# Patient Record
Sex: FEMALE | Race: WHITE | Employment: FULL TIME | URBAN - METROPOLITAN AREA
[De-identification: names, ages, dates, MRNs, and addresses within clinical notes are randomized per-mention and may not be internally consistent; named-entity substitution may affect disease eponyms.]

---

## 2017-04-04 ENCOUNTER — HOSPITAL ENCOUNTER (OUTPATIENT)
Dept: OTHER | Age: 47
Discharge: HOME | End: 2017-04-04
Attending: PHYSICIAN ASSISTANT | Admitting: PHYSICIAN ASSISTANT

## 2017-04-04 ENCOUNTER — OFFICE VISIT (OUTPATIENT)
Dept: FAMILY MEDICINE CLINIC | Age: 47
End: 2017-04-04

## 2017-04-04 ENCOUNTER — HOSPITAL ENCOUNTER (OUTPATIENT)
Dept: GENERAL RADIOLOGY | Age: 47
Discharge: HOME | End: 2017-04-04

## 2017-04-04 VITALS
OXYGEN SATURATION: 99 % | DIASTOLIC BLOOD PRESSURE: 80 MMHG | TEMPERATURE: 98.7 F | WEIGHT: 115.4 LBS | HEART RATE: 112 BPM | HEIGHT: 59 IN | SYSTOLIC BLOOD PRESSURE: 122 MMHG | BODY MASS INDEX: 23.26 KG/M2

## 2017-04-04 DIAGNOSIS — M54.9 BACKACHE, UNSPECIFIED: ICD-10-CM

## 2017-04-04 DIAGNOSIS — M54.9 BACK PAIN, UNSPECIFIED BACK LOCATION, UNSPECIFIED BACK PAIN LATERALITY, UNSPECIFIED CHRONICITY: Primary | ICD-10-CM

## 2017-04-04 PROCEDURE — 99213 OFFICE O/P EST LOW 20 MIN: CPT | Performed by: PHYSICIAN ASSISTANT

## 2017-04-04 RX ORDER — METHOCARBAMOL 500 MG/1
500 TABLET, FILM COATED ORAL 4 TIMES DAILY
Qty: 40 TABLET | Refills: 0 | Status: SHIPPED | OUTPATIENT
Start: 2017-04-04 | End: 2017-04-14

## 2017-04-04 ASSESSMENT — ENCOUNTER SYMPTOMS
RESPIRATORY NEGATIVE: 1
BACK PAIN: 1

## 2018-12-18 ENCOUNTER — OFFICE VISIT (OUTPATIENT)
Dept: FAMILY MEDICINE CLINIC | Age: 48
End: 2018-12-18
Payer: COMMERCIAL

## 2018-12-18 VITALS
BODY MASS INDEX: 22.58 KG/M2 | OXYGEN SATURATION: 98 % | WEIGHT: 115 LBS | HEIGHT: 60 IN | SYSTOLIC BLOOD PRESSURE: 106 MMHG | HEART RATE: 105 BPM | DIASTOLIC BLOOD PRESSURE: 60 MMHG | TEMPERATURE: 98.7 F

## 2018-12-18 DIAGNOSIS — B34.9 VIRAL ILLNESS: Primary | ICD-10-CM

## 2018-12-18 PROCEDURE — 99213 OFFICE O/P EST LOW 20 MIN: CPT | Performed by: FAMILY MEDICINE

## 2018-12-18 RX ORDER — IBUPROFEN 800 MG/1
800 TABLET ORAL
Qty: 30 TABLET | Refills: 2 | Status: SHIPPED | OUTPATIENT
Start: 2018-12-18

## 2018-12-18 ASSESSMENT — ENCOUNTER SYMPTOMS
SINUS PRESSURE: 1
ABDOMINAL PAIN: 0
COUGH: 1
WHEEZING: 0
RHINORRHEA: 1
CHEST TIGHTNESS: 0
STRIDOR: 0

## 2019-03-20 ENCOUNTER — TELEPHONE (OUTPATIENT)
Dept: FAMILY MEDICINE CLINIC | Age: 49
End: 2019-03-20

## 2019-07-22 ENCOUNTER — OFFICE VISIT (OUTPATIENT)
Dept: FAMILY MEDICINE CLINIC | Age: 49
End: 2019-07-22
Payer: COMMERCIAL

## 2019-07-22 VITALS
BODY MASS INDEX: 23.16 KG/M2 | SYSTOLIC BLOOD PRESSURE: 112 MMHG | HEIGHT: 60 IN | HEART RATE: 87 BPM | DIASTOLIC BLOOD PRESSURE: 82 MMHG | WEIGHT: 118 LBS | OXYGEN SATURATION: 99 %

## 2019-07-22 DIAGNOSIS — F41.9 ANXIETY: ICD-10-CM

## 2019-07-22 DIAGNOSIS — Z00.00 ROUTINE GENERAL MEDICAL EXAMINATION AT A HEALTH CARE FACILITY: Primary | ICD-10-CM

## 2019-07-22 DIAGNOSIS — Z12.11 SCREEN FOR COLON CANCER: ICD-10-CM

## 2019-07-22 PROCEDURE — 99396 PREV VISIT EST AGE 40-64: CPT | Performed by: FAMILY MEDICINE

## 2019-07-22 PROCEDURE — 99213 OFFICE O/P EST LOW 20 MIN: CPT | Performed by: FAMILY MEDICINE

## 2019-07-22 NOTE — PROGRESS NOTES
2016    MCV 92.8 2016     2016     Lab Results   Component Value Date    CHOL 189 2016    TRIG 124 2016    HDL 41 2016        Assessment/Plan:    Tomás Heller was seen today for annual exam.    Diagnoses and all orders for this visit:    Routine general medical examination at a health care facility, patient is choosing to go to an outside lab. She is to call the office 48 hours after her blood work to make sure that we get the test results back to her  -     Lipid Panel; Future  -     Comprehensive Metabolic Panel; Future  -     CBC Auto Differential; Future  -     HIV Screen; Future    Screen for colon cancer  -     Melissa Landaverde MD, Gastroenterology, [place]    Anxiety, see below separate visit    Other orders  -     sertraline (ZOLOFT) 50 MG tablet; Take 1 tablet by mouth daily        Subjective:      Ivanna Espana is a 46 y.o. female who presents for evaluation of anxiety. Patient was here for her preventative medicine visit, however she has had significant issues with anxiety. This has stemmed from both of her parents passing away in a very short period of time. Both parents became ill fairly suddenly and  fairly suddenly after their diagnoses. Patient states she really did not have time to grieve from the loss of her mother because immediately she was taking care of her father. However he passed away one year ago. She states prior to this she worried quite a bit. She has 2 adult children that are also a source of anxiety. She has been meeting with a counselor. The counselor suggested that she consider medication for her racing thoughts. Patient has never taken medication previously. She has a cousin that also suffers from anxiety. Patient is unable to exercise due to her urinary incontinence issues.   Patient is also perimenopausal which is contributing to her symptoms    Current Outpatient Medications   Medication Sig Dispense Refill

## 2019-08-16 ENCOUNTER — TELEPHONE (OUTPATIENT)
Dept: FAMILY MEDICINE CLINIC | Age: 49
End: 2019-08-16

## 2019-08-16 ENCOUNTER — NURSE ONLY (OUTPATIENT)
Dept: FAMILY MEDICINE CLINIC | Age: 49
End: 2019-08-16
Payer: COMMERCIAL

## 2019-08-16 DIAGNOSIS — Z00.00 ROUTINE GENERAL MEDICAL EXAMINATION AT A HEALTH CARE FACILITY: ICD-10-CM

## 2019-08-16 LAB
A/G RATIO: 1.9 (ref 1.1–2.2)
ALBUMIN SERPL-MCNC: 4.7 G/DL (ref 3.4–5)
ALP BLD-CCNC: 68 U/L (ref 40–129)
ALT SERPL-CCNC: 21 U/L (ref 10–40)
ANION GAP SERPL CALCULATED.3IONS-SCNC: 15 MMOL/L (ref 3–16)
AST SERPL-CCNC: 16 U/L (ref 15–37)
BASOPHILS ABSOLUTE: 0.1 K/UL (ref 0–0.2)
BASOPHILS RELATIVE PERCENT: 1 %
BILIRUB SERPL-MCNC: 0.4 MG/DL (ref 0–1)
BUN BLDV-MCNC: 20 MG/DL (ref 7–20)
CALCIUM SERPL-MCNC: 9.4 MG/DL (ref 8.3–10.6)
CHLORIDE BLD-SCNC: 105 MMOL/L (ref 99–110)
CHOLESTEROL, TOTAL: 187 MG/DL (ref 0–199)
CO2: 24 MMOL/L (ref 21–32)
CREAT SERPL-MCNC: 0.6 MG/DL (ref 0.6–1.1)
EOSINOPHILS ABSOLUTE: 0.2 K/UL (ref 0–0.6)
EOSINOPHILS RELATIVE PERCENT: 3.4 %
GFR AFRICAN AMERICAN: >60
GFR NON-AFRICAN AMERICAN: >60
GLOBULIN: 2.5 G/DL
GLUCOSE BLD-MCNC: 105 MG/DL (ref 70–99)
HCT VFR BLD CALC: 39.9 % (ref 36–48)
HDLC SERPL-MCNC: 38 MG/DL (ref 40–60)
HEMOGLOBIN: 13.8 G/DL (ref 12–16)
LDL CHOLESTEROL CALCULATED: 113 MG/DL
LYMPHOCYTES ABSOLUTE: 1.5 K/UL (ref 1–5.1)
LYMPHOCYTES RELATIVE PERCENT: 26.5 %
MCH RBC QN AUTO: 32.2 PG (ref 26–34)
MCHC RBC AUTO-ENTMCNC: 34.6 G/DL (ref 31–36)
MCV RBC AUTO: 92.9 FL (ref 80–100)
MONOCYTES ABSOLUTE: 0.4 K/UL (ref 0–1.3)
MONOCYTES RELATIVE PERCENT: 6.7 %
NEUTROPHILS ABSOLUTE: 3.4 K/UL (ref 1.7–7.7)
NEUTROPHILS RELATIVE PERCENT: 62.4 %
PDW BLD-RTO: 12.6 % (ref 12.4–15.4)
PLATELET # BLD: 190 K/UL (ref 135–450)
PMV BLD AUTO: 10.2 FL (ref 5–10.5)
POTASSIUM SERPL-SCNC: 4.1 MMOL/L (ref 3.5–5.1)
RBC # BLD: 4.3 M/UL (ref 4–5.2)
SODIUM BLD-SCNC: 144 MMOL/L (ref 136–145)
TOTAL PROTEIN: 7.2 G/DL (ref 6.4–8.2)
TRIGL SERPL-MCNC: 180 MG/DL (ref 0–150)
VLDLC SERPL CALC-MCNC: 36 MG/DL
WBC # BLD: 5.5 K/UL (ref 4–11)

## 2019-08-16 PROCEDURE — 36415 COLL VENOUS BLD VENIPUNCTURE: CPT | Performed by: FAMILY MEDICINE

## 2019-08-16 NOTE — TELEPHONE ENCOUNTER
Call patient and get more information.   Verify that she was taking a half of her 50 mg tablet for a dose of 25 mg

## 2019-08-17 LAB
HIV AG/AB: NORMAL
HIV ANTIGEN: NORMAL
HIV-1 ANTIBODY: NORMAL
HIV-2 AB: NORMAL

## 2020-09-10 ENCOUNTER — OFFICE VISIT (OUTPATIENT)
Dept: UROGYNECOLOGY | Age: 50
End: 2020-09-10
Payer: COMMERCIAL

## 2020-09-10 VITALS
HEART RATE: 84 BPM | SYSTOLIC BLOOD PRESSURE: 134 MMHG | DIASTOLIC BLOOD PRESSURE: 79 MMHG | TEMPERATURE: 97 F | RESPIRATION RATE: 16 BRPM | OXYGEN SATURATION: 98 %

## 2020-09-10 LAB
BILIRUBIN, POC: NORMAL
BLOOD URINE, POC: NORMAL
CLARITY, POC: CLEAR
COLOR, POC: YELLOW
GLUCOSE URINE, POC: NORMAL
KETONES, POC: NORMAL
LEUKOCYTE EST, POC: NORMAL
NITRITE, POC: NORMAL
PH, POC: 6.5
PROTEIN, POC: NORMAL
SPECIFIC GRAVITY, POC: 1.01
UROBILINOGEN, POC: NORMAL

## 2020-09-10 PROCEDURE — 81002 URINALYSIS NONAUTO W/O SCOPE: CPT | Performed by: OBSTETRICS & GYNECOLOGY

## 2020-09-10 PROCEDURE — 51725 SIMPLE CYSTOMETROGRAM: CPT | Performed by: OBSTETRICS & GYNECOLOGY

## 2020-09-10 PROCEDURE — 99204 OFFICE O/P NEW MOD 45 MIN: CPT | Performed by: OBSTETRICS & GYNECOLOGY

## 2020-09-10 RX ORDER — OXYBUTYNIN CHLORIDE 10 MG/1
10 TABLET, EXTENDED RELEASE ORAL DAILY
Qty: 30 TABLET | Refills: 1 | Status: SHIPPED | OUTPATIENT
Start: 2020-09-10

## 2020-09-10 ASSESSMENT — ENCOUNTER SYMPTOMS: ABDOMINAL PAIN: 1

## 2020-09-10 NOTE — PROGRESS NOTES
9/10/2020      HPI:     Name: Mary Metcalf  YOB: 1970    CC: Patient is a 44 y.o. female who is seen in consultation from Daniel Berg   for evaluation of stress incontinence  and urge incontinence . HPI:  She describes urinary urgency and frequency with significant amounts of stress incontinence. She also complains of frequency that she can contributes to large amounts of caffeine that she drinks. Bladder control problem: yes, use pads, 5 trips to the bathroom during the day, lose of urine during coughing, sneezing, running or lifting  Bladder emptying problems: no  Prolapse/Vaginal Support problems: no  Bowel problem(s): no  Sexual History:  reports being sexually active and has had partner(s) who are Male. Pelvic Pain:  no  Ob/Gyn History:    OB History   No obstetric history on file. Past Medical History:   Past Medical History:   Diagnosis Date    YVONNE (stress urinary incontinence, female)      Past Surgical History:   Past Surgical History:   Procedure Laterality Date    WISDOM TOOTH EXTRACTION       Allergies: No Known Allergies  Current Medications:  Current Outpatient Medications   Medication Sig Dispense Refill    oxybutynin (DITROPAN-XL) 10 MG extended release tablet Take 1 tablet by mouth daily 30 tablet 1    sertraline (ZOLOFT) 50 MG tablet Take 1 tablet by mouth daily (Patient not taking: Reported on 9/10/2020) 30 tablet 2    ibuprofen (ADVIL;MOTRIN) 800 MG tablet Take 1 tablet by mouth 3 times daily (with meals) (Patient not taking: Reported on 9/10/2020) 30 tablet 2     No current facility-administered medications for this visit.       Social History:   Social History     Socioeconomic History    Marital status:      Spouse name: Not on file    Number of children: 2    Years of education: Not on file    Highest education level: Not on file   Occupational History    Occupation:    Social Needs    Financial resource strain: Not on file  Food insecurity     Worry: Not on file     Inability: Not on file    Transportation needs     Medical: Not on file     Non-medical: Not on file   Tobacco Use    Smoking status: Never Smoker    Smokeless tobacco: Never Used   Substance and Sexual Activity    Alcohol use: No    Drug use: Never    Sexual activity: Yes     Partners: Male   Lifestyle    Physical activity     Days per week: Not on file     Minutes per session: Not on file    Stress: Not on file   Relationships    Social connections     Talks on phone: Not on file     Gets together: Not on file     Attends Jainism service: Not on file     Active member of club or organization: Not on file     Attends meetings of clubs or organizations: Not on file     Relationship status: Not on file    Intimate partner violence     Fear of current or ex partner: Not on file     Emotionally abused: Not on file     Physically abused: Not on file     Forced sexual activity: Not on file   Other Topics Concern    Not on file   Social History Narrative    Not regular, diet need improvment     Family History:   Family History   Problem Relation Age of Onset    Heart Failure Father     High Blood Pressure Father     Other Father         PVD    Diabetes Father     Cancer Father         esophageal    High Blood Pressure Mother     Other Mother         Carotid stenosis    Cancer Mother         leukemia    Cancer Maternal Grandmother         cervical    Cancer Paternal Grandmother      Review of System  Review of Systems   Gastrointestinal: Positive for abdominal pain. Genitourinary: Positive for enuresis and urgency. Neurological: Positive for headaches. Objective:     Vital Signs  Vitals:    09/10/20 1510   BP: 134/79   Pulse: 84   Resp: 16   Temp: 97 °F (36.1 °C)   SpO2: 98%     Physical Exam  Physical Exam  HENT:      Head: Normocephalic and atraumatic.    Eyes:      Conjunctiva/sclera: Conjunctivae normal.   Neck:      Musculoskeletal: Normal range of motion and neck supple. Pulmonary:      Effort: Pulmonary effort is normal.   Abdominal:      Palpations: Abdomen is soft. Genitourinary:     Comments: Small cystocele, no levator muscle strength  Skin:     General: Skin is warm and dry. Neurological:      Mental Status: She is alert and oriented to person, place, and time. Office Fill Study/Urine Dip:     Using sterile technique a manometry catheter was placed. Patient's bladder was filled with sterile water by gravity. Capacity and storage volumes were measured. Spasms assessed. Catheter was removed. Stress urinary incontinence was assessed. Results for POC orders placed in visit on 09/10/20   POCT Urinalysis no Micro   Result Value Ref Range    Color, UA yellow     Clarity, UA clear     Glucose, UA POC neg     Bilirubin, UA nge     Ketones, UA neg     Spec Grav, UA 1.015     Blood, UA POC neg     pH, UA 6.5     Protein, UA POC neg     Urobilinogen, UA neg     Leukocytes, UA neg     Nitrite, UA neg        PVR: 20 cc  1st Sensation: 30 cc  2nd Sensation: 110 cc  Max Sensation: 130 cc  Empty Cough Stress Test: negative  Full Cough Stress Test: negative  Spasms: No    POPQ and Pelvic Exam:     Anterior Wall (Aa): 0 Anterior Wall (Ba): 0 Cervix or Cuff (C): -1   Genital Haitus (gh): 3 Perineal body (pb): 3 Total Vaginal Length (tvl): 8   Posterior Wall (Ap): -3 Posterior Wall (Bp): -3 Posterior Fornix (D): -6     OXFORD SCALE MUSCLE STRENGTH=  0   / 5    Assessment/Plan:     Dominique Caraballo is a 44 y.o. female with   1. Enuresis    2. Stress incontinence    3. Urinary urgency    4. Cystocele, midline      She has a small asymptomatic cystocele however has significant urinary frequency and by history stress incontinence. However on filling water cystometry today she had predominantly a small bladder capacity and we could not demonstrate stress incontinence.   She would like to try pelvic floor physical therapy and referral was given to [place]

## 2020-11-17 ENCOUNTER — TELEPHONE (OUTPATIENT)
Dept: UROGYNECOLOGY | Age: 50
End: 2020-11-17